# Patient Record
Sex: FEMALE | Race: WHITE | NOT HISPANIC OR LATINO | Employment: UNEMPLOYED | URBAN - METROPOLITAN AREA
[De-identification: names, ages, dates, MRNs, and addresses within clinical notes are randomized per-mention and may not be internally consistent; named-entity substitution may affect disease eponyms.]

---

## 2023-03-21 LAB
ALANINE AMINOTRANSFERASE (SGPT) (U/L) IN SER/PLAS: 8 U/L (ref 7–45)
ALBUMIN (G/DL) IN SER/PLAS: 3.6 G/DL (ref 3.4–5)
ALKALINE PHOSPHATASE (U/L) IN SER/PLAS: 53 U/L (ref 33–110)
ANION GAP IN SER/PLAS: 11 MMOL/L (ref 10–20)
ASPARTATE AMINOTRANSFERASE (SGOT) (U/L) IN SER/PLAS: 10 U/L (ref 9–39)
BILIRUBIN TOTAL (MG/DL) IN SER/PLAS: 0.2 MG/DL (ref 0–1.2)
CALCIUM (MG/DL) IN SER/PLAS: 8.2 MG/DL (ref 8.6–10.3)
CARBON DIOXIDE, TOTAL (MMOL/L) IN SER/PLAS: 24 MMOL/L (ref 21–32)
CHLORIDE (MMOL/L) IN SER/PLAS: 104 MMOL/L (ref 98–107)
CREATININE (MG/DL) IN SER/PLAS: 0.48 MG/DL (ref 0.5–1.05)
ERYTHROCYTE DISTRIBUTION WIDTH (RATIO) BY AUTOMATED COUNT: 14.9 % (ref 11.5–14.5)
ERYTHROCYTE MEAN CORPUSCULAR HEMOGLOBIN CONCENTRATION (G/DL) BY AUTOMATED: 32.1 G/DL (ref 32–36)
ERYTHROCYTE MEAN CORPUSCULAR VOLUME (FL) BY AUTOMATED COUNT: 79 FL (ref 80–100)
ERYTHROCYTES (10*6/UL) IN BLOOD BY AUTOMATED COUNT: 4.78 X10E12/L (ref 4–5.2)
GFR FEMALE: >90 ML/MIN/1.73M2
GLUCOSE (MG/DL) IN SER/PLAS: 96 MG/DL (ref 74–99)
HEMATOCRIT (%) IN BLOOD BY AUTOMATED COUNT: 38 % (ref 36–46)
HEMOGLOBIN (G/DL) IN BLOOD: 12.2 G/DL (ref 12–16)
LEUKOCYTES (10*3/UL) IN BLOOD BY AUTOMATED COUNT: 13.1 X10E9/L (ref 4.4–11.3)
MAGNESIUM (MG/DL) IN SER/PLAS: 1.8 MG/DL (ref 1.6–2.4)
PLATELETS (10*3/UL) IN BLOOD AUTOMATED COUNT: 323 X10E9/L (ref 150–450)
POTASSIUM (MMOL/L) IN SER/PLAS: 3.8 MMOL/L (ref 3.5–5.3)
PROTEIN TOTAL: 6.5 G/DL (ref 6.4–8.2)
REFLEX ADDED, ANEMIA PANEL: ABNORMAL
SODIUM (MMOL/L) IN SER/PLAS: 135 MMOL/L (ref 136–145)
THYROTROPIN (MIU/L) IN SER/PLAS BY DETECTION LIMIT <= 0.05 MIU/L: <0.01 MIU/L (ref 0.44–3.98)
THYROXINE (T4) FREE (NG/DL) IN SER/PLAS: 1.19 NG/DL (ref 0.61–1.12)
UREA NITROGEN (MG/DL) IN SER/PLAS: 7 MG/DL (ref 6–23)

## 2023-03-22 LAB
CREATININE (MG/DL) IN URINE: 106 MG/DL (ref 20–320)
PROTEIN (MG/DL) IN URINE: 16 MG/DL (ref 5–24)
PROTEIN/CREATININE (MG/MG) IN URINE: 0.15 MG/MG CREAT (ref 0–0.17)

## 2023-04-09 ENCOUNTER — HOSPITAL ENCOUNTER (OUTPATIENT)
Dept: DATA CONVERSION | Facility: HOSPITAL | Age: 28
End: 2023-04-09
Attending: OBSTETRICS & GYNECOLOGY

## 2023-04-09 DIAGNOSIS — O99.332 SMOKING (TOBACCO) COMPLICATING PREGNANCY, SECOND TRIMESTER (HHS-HCC): ICD-10-CM

## 2023-04-09 DIAGNOSIS — E66.9 OBESITY, UNSPECIFIED: ICD-10-CM

## 2023-04-09 DIAGNOSIS — O24.414 GESTATIONAL DIABETES MELLITUS IN PREGNANCY, INSULIN CONTROLLED (HHS-HCC): ICD-10-CM

## 2023-04-09 DIAGNOSIS — Z79.899 OTHER LONG TERM (CURRENT) DRUG THERAPY: ICD-10-CM

## 2023-04-09 DIAGNOSIS — R51.9 HEADACHE, UNSPECIFIED: ICD-10-CM

## 2023-04-09 DIAGNOSIS — M79.89 OTHER SPECIFIED SOFT TISSUE DISORDERS: ICD-10-CM

## 2023-04-09 DIAGNOSIS — R03.0 ELEVATED BLOOD-PRESSURE READING, WITHOUT DIAGNOSIS OF HYPERTENSION: ICD-10-CM

## 2023-04-09 DIAGNOSIS — R10.9 UNSPECIFIED ABDOMINAL PAIN: ICD-10-CM

## 2023-04-09 DIAGNOSIS — O10.912 UNSPECIFIED PRE-EXISTING HYPERTENSION COMPLICATING PREGNANCY, SECOND TRIMESTER (HHS-HCC): ICD-10-CM

## 2023-04-09 DIAGNOSIS — O26.22: ICD-10-CM

## 2023-04-09 DIAGNOSIS — O99.212 OBESITY COMPLICATING PREGNANCY, SECOND TRIMESTER (HHS-HCC): ICD-10-CM

## 2023-04-09 DIAGNOSIS — O99.891 OTHER SPECIFIED DISEASES AND CONDITIONS COMPLICATING PREGNANCY (HHS-HCC): ICD-10-CM

## 2023-04-09 DIAGNOSIS — F17.200 NICOTINE DEPENDENCE, UNSPECIFIED, UNCOMPLICATED: ICD-10-CM

## 2023-04-09 DIAGNOSIS — E05.90 THYROTOXICOSIS, UNSPECIFIED WITHOUT THYROTOXIC CRISIS OR STORM: ICD-10-CM

## 2023-04-09 DIAGNOSIS — Z34.80 ENCOUNTER FOR SUPERVISION OF OTHER NORMAL PREGNANCY, UNSPECIFIED TRIMESTER (HHS-HCC): ICD-10-CM

## 2023-04-09 DIAGNOSIS — O26.892 OTHER SPECIFIED PREGNANCY RELATED CONDITIONS, SECOND TRIMESTER (HHS-HCC): ICD-10-CM

## 2023-04-09 DIAGNOSIS — Z79.82 LONG TERM (CURRENT) USE OF ASPIRIN: ICD-10-CM

## 2023-04-09 DIAGNOSIS — Z3A.25 25 WEEKS GESTATION OF PREGNANCY (HHS-HCC): ICD-10-CM

## 2023-04-09 DIAGNOSIS — O09.212 SUPERVISION OF PREGNANCY WITH HISTORY OF PRE-TERM LABOR, SECOND TRIMESTER (HHS-HCC): ICD-10-CM

## 2023-04-09 DIAGNOSIS — O99.282 ENDOCRINE, NUTRITIONAL AND METABOLIC DISEASES COMPLICATING PREGNANCY, SECOND TRIMESTER (HHS-HCC): ICD-10-CM

## 2023-04-09 LAB
ALANINE AMINOTRANSFERASE (SGPT) (U/L) IN SER/PLAS: 8 U/L (ref 7–45)
ALBUMIN (G/DL) IN SER/PLAS: 3.3 G/DL (ref 3.4–5)
ALKALINE PHOSPHATASE (U/L) IN SER/PLAS: 53 U/L (ref 33–110)
ANION GAP IN SER/PLAS: 14 MMOL/L (ref 10–20)
ASPARTATE AMINOTRANSFERASE (SGOT) (U/L) IN SER/PLAS: 10 U/L (ref 9–39)
BILIRUBIN TOTAL (MG/DL) IN SER/PLAS: 0.3 MG/DL (ref 0–1.2)
CALCIUM (MG/DL) IN SER/PLAS: 8.6 MG/DL (ref 8.6–10.6)
CARBON DIOXIDE, TOTAL (MMOL/L) IN SER/PLAS: 21 MMOL/L (ref 21–32)
CHLORIDE (MMOL/L) IN SER/PLAS: 103 MMOL/L (ref 98–107)
CREATININE (MG/DL) IN SER/PLAS: 0.48 MG/DL (ref 0.5–1.05)
CREATININE (MG/DL) IN URINE: 71.9 MG/DL (ref 20–320)
ERYTHROCYTE DISTRIBUTION WIDTH (RATIO) BY AUTOMATED COUNT: 15.1 % (ref 11.5–14.5)
ERYTHROCYTE MEAN CORPUSCULAR HEMOGLOBIN CONCENTRATION (G/DL) BY AUTOMATED: 32.5 G/DL (ref 32–36)
ERYTHROCYTE MEAN CORPUSCULAR VOLUME (FL) BY AUTOMATED COUNT: 80 FL (ref 80–100)
ERYTHROCYTES (10*6/UL) IN BLOOD BY AUTOMATED COUNT: 4.3 X10E12/L (ref 4–5.2)
GFR FEMALE: >90 ML/MIN/1.73M2
GLUCOSE (MG/DL) IN SER/PLAS: 177 MG/DL (ref 74–99)
HEMATOCRIT (%) IN BLOOD BY AUTOMATED COUNT: 34.2 % (ref 36–46)
HEMOGLOBIN (G/DL) IN BLOOD: 11.1 G/DL (ref 12–16)
LACTATE DEHYDROGENASE (U/L) IN SER/PLAS BY LAC->PYR RXN: 118 U/L (ref 84–246)
LEUKOCYTES (10*3/UL) IN BLOOD BY AUTOMATED COUNT: 10.9 X10E9/L (ref 4.4–11.3)
NRBC (PER 100 WBCS) BY AUTOMATED COUNT: 0 /100 WBC (ref 0–0)
PLATELETS (10*3/UL) IN BLOOD AUTOMATED COUNT: 287 X10E9/L (ref 150–450)
POCT GLUCOSE: 189 MG/DL (ref 74–99)
POTASSIUM (MMOL/L) IN SER/PLAS: 3.6 MMOL/L (ref 3.5–5.3)
PROTEIN (MG/DL) IN URINE: 12 MG/DL (ref 5–24)
PROTEIN TOTAL: 5.9 G/DL (ref 6.4–8.2)
PROTEIN/CREATININE (MG/MG) IN URINE: 0.17 MG/MG CREAT (ref 0–0.17)
SODIUM (MMOL/L) IN SER/PLAS: 134 MMOL/L (ref 136–145)
UREA NITROGEN (MG/DL) IN SER/PLAS: 9 MG/DL (ref 6–23)

## 2023-04-10 ENCOUNTER — HOSPITAL ENCOUNTER (OUTPATIENT)
Dept: DATA CONVERSION | Facility: HOSPITAL | Age: 28
End: 2023-04-10
Attending: OBSTETRICS & GYNECOLOGY

## 2023-04-10 DIAGNOSIS — Z79.82 LONG TERM (CURRENT) USE OF ASPIRIN: ICD-10-CM

## 2023-04-10 DIAGNOSIS — O10.912 UNSPECIFIED PRE-EXISTING HYPERTENSION COMPLICATING PREGNANCY, SECOND TRIMESTER (HHS-HCC): ICD-10-CM

## 2023-04-10 DIAGNOSIS — Z3A.25 25 WEEKS GESTATION OF PREGNANCY (HHS-HCC): ICD-10-CM

## 2023-04-10 DIAGNOSIS — Z79.899 OTHER LONG TERM (CURRENT) DRUG THERAPY: ICD-10-CM

## 2023-04-10 DIAGNOSIS — R10.9 UNSPECIFIED ABDOMINAL PAIN: ICD-10-CM

## 2023-04-10 DIAGNOSIS — O26.892 OTHER SPECIFIED PREGNANCY RELATED CONDITIONS, SECOND TRIMESTER (HHS-HCC): ICD-10-CM

## 2023-04-10 DIAGNOSIS — H53.8 OTHER VISUAL DISTURBANCES: ICD-10-CM

## 2023-04-10 DIAGNOSIS — E05.90 THYROTOXICOSIS, UNSPECIFIED WITHOUT THYROTOXIC CRISIS OR STORM: ICD-10-CM

## 2023-04-10 DIAGNOSIS — O99.282 ENDOCRINE, NUTRITIONAL AND METABOLIC DISEASES COMPLICATING PREGNANCY, SECOND TRIMESTER (HHS-HCC): ICD-10-CM

## 2023-04-10 DIAGNOSIS — F43.10 POST-TRAUMATIC STRESS DISORDER, UNSPECIFIED: ICD-10-CM

## 2023-04-10 DIAGNOSIS — G40.909 EPILEPSY, UNSPECIFIED, NOT INTRACTABLE, WITHOUT STATUS EPILEPTICUS (MULTI): ICD-10-CM

## 2023-04-10 DIAGNOSIS — O09.212 SUPERVISION OF PREGNANCY WITH HISTORY OF PRE-TERM LABOR, SECOND TRIMESTER (HHS-HCC): ICD-10-CM

## 2023-04-10 DIAGNOSIS — O24.414 GESTATIONAL DIABETES MELLITUS IN PREGNANCY, INSULIN CONTROLLED (HHS-HCC): ICD-10-CM

## 2023-04-10 DIAGNOSIS — O26.852 SPOTTING COMPLICATING PREGNANCY, SECOND TRIMESTER (HHS-HCC): ICD-10-CM

## 2023-04-10 DIAGNOSIS — O26.22: ICD-10-CM

## 2023-04-10 DIAGNOSIS — O99.212 OBESITY COMPLICATING PREGNANCY, SECOND TRIMESTER (HHS-HCC): ICD-10-CM

## 2023-04-10 DIAGNOSIS — E66.9 OBESITY, UNSPECIFIED: ICD-10-CM

## 2023-04-10 DIAGNOSIS — O99.352 DISEASES OF THE NERVOUS SYSTEM COMPLICATING PREGNANCY, SECOND TRIMESTER (HHS-HCC): ICD-10-CM

## 2023-04-10 DIAGNOSIS — O99.342 OTHER MENTAL DISORDERS COMPLICATING PREGNANCY, SECOND TRIMESTER (HHS-HCC): ICD-10-CM

## 2023-04-11 LAB
CHLAMYDIA TRACH., AMPLIFIED: NEGATIVE
CLUE CELLS WET PREP: PRESENT
N. GONORRHEA, AMPLIFIED: NEGATIVE
TRICH WET PREP: ABNORMAL
TRICHOMONAS REFLEX COMMENT: ABNORMAL
TRICHOMONAS VAGINALIS: NEGATIVE
WBC WET PREP: ABNORMAL /HPF
YEAST PRESENCE WET PREP: ABNORMAL

## 2023-04-18 LAB
ATRIAL RATE: 102 BPM
P AXIS: 59 DEGREES
P OFFSET: 191 MS
P ONSET: 137 MS
PR INTERVAL: 166 MS
Q ONSET: 220 MS
QRS COUNT: 17 BEATS
QRS DURATION: 96 MS
QT INTERVAL: 360 MS
QTC CALCULATION(BAZETT): 469 MS
QTC FREDERICIA: 429 MS
R AXIS: 60 DEGREES
T AXIS: 35 DEGREES
T OFFSET: 400 MS
VENTRICULAR RATE: 102 BPM

## 2023-09-06 VITALS — WEIGHT: 194.22 LBS | BODY MASS INDEX: 35.74 KG/M2 | HEIGHT: 62 IN

## 2023-09-07 VITALS — WEIGHT: 194.89 LBS | BODY MASS INDEX: 35.86 KG/M2 | HEIGHT: 62 IN

## 2023-09-14 NOTE — PROGRESS NOTES
Current Stage:   Stage: Triage     Subjective Data:   Antepartum   Antepartum:    28 y/o -2-10-3 @ 25wga by 6 week scan presenting for elevated BPs, headache, abdominal pain, and leg swelling    Patient reports cramping since 5:30pm , denies VB, LOF. No other vaginal/OB complaints.   She reports having had a seziure two weeks ago which was unwitnessed and she called EMS herself but ultimately has no information regarding if anything was done. Her primary OB provider recommended she see neurology for assessment of presence of seizure  disorder. She did have an eclamptic seizure resulting in an IOL at 29wks gestation in her  pregnancy. She denies any other seizure hx.   She has been complaining of headaches and floaters in her vision for the last three weeks. Has not tried any medications for relief.   No other acute concerns.    Pregnancy notable for:  - cHTN, on labetalol 200mg BID  - GDMA2 - on NPH , has CGM. Last growth 3/10 384g (53%), AC 23%  - Hyperthyroid, on methimazole 10mg daily   - h/o eclampsia in prior pregnancy   - Recurrent pregnancy loss  - Class 1 obesity    OBHx:  -2021:  @ 29wga, IOL for eclampsia, 4# M   -2020:  @ 34wga, IOL for sPEC, GDMA2, 5#5 M  -2015:  @ 37wga, IOL for sPEC, 6#6 F   -SAB x10  GynHx: denies STIs or abnormal paps  PMHx: as above  SurgHx: denies  Meds: labetalol 200mg BID, methimazole 5mg, PNV, ASA  All: PCN (hives), latex, zyrtec  Social: denies t/e/i       Objective Information     Objective Information:      T   P  R  BP   MAP  SpO2   Value     102     126/67   91  98%  Date/Time    16:54    16:45   16:45  16:54  Range     (99 - 117 )    (120 - 134 )/ (60 - 72 )  (83 - 95 )  (95% - 99% )      Pain reported at  16:10: 4 = Moderate    Recent Lab Results     Results:    CBC: 2023 14:59              \     Hgb     /                              \     11.1 L    /  WBC  ----------------  Plt               10.9        ----------------    287              /     Hct     \                              /     34.2 L    \            RBC: 4.30     MCV: 80           CMP: 2023 14:59  NA+        Cl-     BUN  /                         134 L   103    9  /  --------------------------------  Glucose                ---------------------------  177 H    K+     HCO3-   Creat \                         3.6    21    0.48 L \           \  T Bili  /                    \  0.3  /  AST  x ---- x ALT        10 x ---- x 8         /  Alk P   \               /  53  \  Calcium : 8.6     Anion Gap : 14     Albumin : 3.3 L    T Protein : 5.9 L           Assessment and Plan:   Assessment:    28 y/o -2-10-3 @ 25wga by 6 week scan presenting for elevated BPs, headache, abdominal pain, and leg swelling    Diffuse Complaints  - normotensive through 2hrs of cycled BPs, repeat HELLP labs wnl with p:c 0.17  - cervix evaluated, internal os closed; FHT reassuring  - no LE edema apparent  - on chart review, HAs and floaters present for >3wks   - 500cc IVF bolus, Tylenol & reglan provided  - patient provided precautions for PTL & PEC    GDM  - spot POCT 189 in triage, treated with 2u lispro during evaluation  - MFM appointment on , patient encouraged to present as scheduled    Patient seen by and discussed with Dr Steffany Moreno MD MPH (PGY-3)      Attestation:   Note Completion   I am a:  Resident/Fellow   Attending Attestation I saw and evaluated the patient.  I personally obtained the key and critical portions of the history and physical exam or was physically present for key and  critical portions performed by the resident/fellow. I reviewed the resident/fellow?s documentation and discussed the patient with the resident/fellow.  I agree with the resident/fellow?s medical decision making as documented in their note  with the exception/addition of the following:   I personally evaluated the patient on 2023   Comments/ Additional Findings     Pt's main complaint that brought her in is cramping which she says starts in her back, radiates down her legs, and causes vision changes when she  has cramping.  Headache improved with medications.  Tracing AGA with a change of baseline, mod variability. + accels.   Discussed return precautions in detail with pt.  STable for discharge.  Ismael Jeter MD      Electronic Signatures for Addendum Section:   Belkis Moreno (MD (Resident)) (Signed Addendum 10-Apr-2023 22:48)   Physical Exam:    Gen - comfortable-appearing, NAD, conversational  HEENT - MMM, normocephalic atruamatic  Cardiac - warm and well perfused  Pulm - normal effort of breathing ORA  Abd - soft, gravid, nontender  OB - cervix internal os closed, no abnl vaginal discharge noted, /mod/+/- (AGA)  MSK - full ROM  Ext - no LE edema, nontender to palpation bilaterally  Neuro - CNs grossly intact  Psych - appropriate affect and behavior     Electronic Signatures:  Ismael Jeter (MD)  (Signed 09-Apr-2023 17:55)   Authored: Note Completion   Co-Signer: Current Stage, Subjective Data, Objective Data, Assessment and Plan, Note Completion  Belkis Moreno (MD (Resident))  (Signed 09-Apr-2023 17:25)   Authored: Current Stage, Subjective Data, Objective Data,  Assessment and Plan, Note Completion      Last Updated: 10-Apr-2023 22:48 by Belkis Moreno (MD (Resident))
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
